# Patient Record
Sex: MALE | URBAN - METROPOLITAN AREA
[De-identification: names, ages, dates, MRNs, and addresses within clinical notes are randomized per-mention and may not be internally consistent; named-entity substitution may affect disease eponyms.]

---

## 2020-10-30 ENCOUNTER — NEW PATIENT (OUTPATIENT)
Dept: URBAN - METROPOLITAN AREA CLINIC 56 | Facility: CLINIC | Age: 75
End: 2020-10-30
Payer: MEDICARE

## 2020-10-30 DIAGNOSIS — Z79.84 LONG TERM (CURRENT) USE OF ORAL ANTIDIABETIC DRUGS: ICD-10-CM

## 2020-10-30 DIAGNOSIS — H25.13 AGE-RELATED NUCLEAR CATARACT, BILATERAL: Primary | ICD-10-CM

## 2020-10-30 DIAGNOSIS — H11.041 PERIPHERAL PTERYGIUM, STATIONARY, RIGHT EYE: ICD-10-CM

## 2020-10-30 DIAGNOSIS — E11.9 TYPE 2 DIABETES MELLITUS WITHOUT COMPLICATIONS: ICD-10-CM

## 2020-10-30 PROCEDURE — 92004 COMPRE OPH EXAM NEW PT 1/>: CPT | Performed by: OPHTHALMOLOGY

## 2020-10-30 PROCEDURE — 92250 FUNDUS PHOTOGRAPHY W/I&R: CPT | Performed by: OPHTHALMOLOGY

## 2020-10-30 ASSESSMENT — KERATOMETRY
OS: 44.09
OD: 44.01

## 2020-10-30 ASSESSMENT — VISUAL ACUITY
OD: 20/20
OS: 20/20

## 2020-10-30 ASSESSMENT — INTRAOCULAR PRESSURE
OS: 16
OD: 16

## 2024-10-03 ENCOUNTER — OFFICE VISIT (OUTPATIENT)
Dept: URBAN - METROPOLITAN AREA CLINIC 56 | Facility: LOCATION | Age: 79
End: 2024-10-03
Payer: MEDICARE

## 2024-10-03 DIAGNOSIS — E11.9 TYPE 2 DIABETES MELLITUS W/O COMPLICATION: Primary | ICD-10-CM

## 2024-10-03 DIAGNOSIS — H02.34 BLEPHAROCHALASIS OF LEFT UPPER LID: ICD-10-CM

## 2024-10-03 DIAGNOSIS — H25.813 COMBINED FORMS OF AGE-RELATED CATARACT, BILATERAL: ICD-10-CM

## 2024-10-03 DIAGNOSIS — H02.31 BLEPHAROCHALASIS OF RIGHT UPPER LID: ICD-10-CM

## 2024-10-03 DIAGNOSIS — H35.373 PUCKERING OF MACULA, BILATERAL: ICD-10-CM

## 2024-10-03 DIAGNOSIS — D23.121 OTHER BENIGN NEOPLASM SKIN/ LEFT UPPER EYELID, INC CANTHUS: ICD-10-CM

## 2024-10-03 PROCEDURE — 92134 CPTRZ OPH DX IMG PST SGM RTA: CPT | Performed by: OPHTHALMOLOGY

## 2024-10-03 PROCEDURE — 92004 COMPRE OPH EXAM NEW PT 1/>: CPT | Performed by: OPHTHALMOLOGY

## 2024-10-03 ASSESSMENT — INTRAOCULAR PRESSURE
OD: 16
OS: 16

## 2024-10-03 ASSESSMENT — KERATOMETRY
OS: 43.87
OD: 44.13

## 2024-11-04 ENCOUNTER — APPOINTMENT (RX ONLY)
Dept: URBAN - METROPOLITAN AREA CLINIC 174 | Facility: CLINIC | Age: 79
Setting detail: DERMATOLOGY
End: 2024-11-04

## 2024-11-04 DIAGNOSIS — L81.4 OTHER MELANIN HYPERPIGMENTATION: ICD-10-CM

## 2024-11-04 DIAGNOSIS — L82.1 OTHER SEBORRHEIC KERATOSIS: ICD-10-CM

## 2024-11-04 DIAGNOSIS — D18.0 HEMANGIOMA: ICD-10-CM

## 2024-11-04 PROBLEM — D18.01 HEMANGIOMA OF SKIN AND SUBCUTANEOUS TISSUE: Status: ACTIVE | Noted: 2024-11-04

## 2024-11-04 PROCEDURE — 99203 OFFICE O/P NEW LOW 30 MIN: CPT

## 2024-11-04 PROCEDURE — ? COUNSELING

## 2024-11-04 ASSESSMENT — LOCATION SIMPLE DESCRIPTION DERM
LOCATION SIMPLE: SCALP
LOCATION SIMPLE: LEFT ANTERIOR NECK
LOCATION SIMPLE: LEFT CHEEK
LOCATION SIMPLE: LEFT FOREHEAD

## 2024-11-04 ASSESSMENT — LOCATION ZONE DERM
LOCATION ZONE: FACE
LOCATION ZONE: NECK
LOCATION ZONE: SCALP

## 2024-11-04 ASSESSMENT — LOCATION DETAILED DESCRIPTION DERM
LOCATION DETAILED: LEFT CENTRAL MALAR CHEEK
LOCATION DETAILED: LEFT SUPERIOR ANTERIOR NECK
LOCATION DETAILED: LEFT SUPERIOR FOREHEAD
LOCATION DETAILED: LEFT SUPERIOR FRONTAL SCALP

## 2024-11-04 NOTE — PROCEDURE: COUNSELING
Detail Level: Detailed
The Caprini score indicates that this patient is at high risk for a VTE event (score 6 or greater). Surgical patients in this group will benefit from both pharmacologic prophylaxis and intermittent compression devices.  The surgical team will determine the balance between VTE risk and bleeding risk, and other clinical considerations

## 2024-11-19 ENCOUNTER — OFFICE VISIT (OUTPATIENT)
Facility: LOCATION | Age: 79
End: 2024-11-19
Payer: COMMERCIAL

## 2024-11-19 DIAGNOSIS — E11.9 TYPE II DIABETES MELLITUS WITHOUT COMPLICATION: ICD-10-CM

## 2024-11-19 DIAGNOSIS — H52.4 PRESBYOPIA: Primary | ICD-10-CM

## 2024-11-19 DIAGNOSIS — H25.813 COMBINED FORMS OF AGE-RELATED CATARACT, BILATERAL: ICD-10-CM

## 2024-11-19 DIAGNOSIS — D23.121 OTHER BENIGN NEOPLASM SKIN/ LEFT UPPER EYELID, INC CANTHUS: ICD-10-CM

## 2024-11-19 PROCEDURE — 92004 COMPRE OPH EXAM NEW PT 1/>: CPT

## 2024-11-19 ASSESSMENT — INTRAOCULAR PRESSURE
OD: 15
OS: 20

## 2024-11-19 ASSESSMENT — KERATOMETRY
OD: 44.25
OS: 43.63

## 2024-11-19 ASSESSMENT — VISUAL ACUITY
OD: 20/25
OS: 20/20

## 2025-03-03 ENCOUNTER — OFFICE VISIT (OUTPATIENT)
Dept: URBAN - METROPOLITAN AREA CLINIC 56 | Facility: LOCATION | Age: 80
End: 2025-03-03
Payer: MEDICARE

## 2025-03-03 DIAGNOSIS — D23.121 OTHER BENIGN NEOPLASM SKIN/ LEFT UPPER EYELID, INC CANTHUS: ICD-10-CM

## 2025-03-03 DIAGNOSIS — H02.831 DERMATOCHALASIS OF RIGHT UPPER LID: Primary | ICD-10-CM

## 2025-03-03 DIAGNOSIS — H02.834 DERMATOCHALASIS OF LEFT UPPER LID: ICD-10-CM

## 2025-05-27 ENCOUNTER — OFFICE VISIT (OUTPATIENT)
Facility: LOCATION | Age: 80
End: 2025-05-27
Payer: MEDICARE

## 2025-05-27 DIAGNOSIS — E11.9 TYPE II DIABETES MELLITUS WITHOUT COMPLICATION: Primary | ICD-10-CM

## 2025-05-27 DIAGNOSIS — H25.813 COMBINED FORMS OF AGE-RELATED CATARACT, BILATERAL: ICD-10-CM

## 2025-05-27 DIAGNOSIS — H02.834 DERMATOCHALASIS OF LEFT UPPER LID: ICD-10-CM

## 2025-05-27 DIAGNOSIS — H02.831 DERMATOCHALASIS OF RIGHT UPPER LID: ICD-10-CM

## 2025-05-27 DIAGNOSIS — H16.223 KERATOCONJUNCT SICCA, NOT SPECIFIED AS SJOGREN'S, BILATERAL: ICD-10-CM

## 2025-05-27 PROCEDURE — 99214 OFFICE O/P EST MOD 30 MIN: CPT

## 2025-05-27 ASSESSMENT — KERATOMETRY
OS: 43.75
OD: 44.38

## 2025-05-27 ASSESSMENT — INTRAOCULAR PRESSURE
OD: 14
OS: 14

## 2025-05-27 ASSESSMENT — VISUAL ACUITY
OS: 20/20
OD: 20/30

## 2025-06-19 ENCOUNTER — OFFICE VISIT (OUTPATIENT)
Facility: LOCATION | Age: 80
End: 2025-06-19
Payer: COMMERCIAL

## 2025-06-19 DIAGNOSIS — H52.4 PRESBYOPIA: Primary | ICD-10-CM

## 2025-06-19 PROCEDURE — 92012 INTRM OPH EXAM EST PATIENT: CPT

## 2025-06-19 ASSESSMENT — VISUAL ACUITY
OS: 20/30
OD: 20/30

## 2025-06-19 ASSESSMENT — KERATOMETRY
OD: 44.50
OS: 43.75

## 2025-07-17 ENCOUNTER — OFFICE VISIT (OUTPATIENT)
Dept: URBAN - METROPOLITAN AREA CLINIC 44 | Facility: CLINIC | Age: 80
End: 2025-07-17
Payer: MEDICARE

## 2025-07-17 DIAGNOSIS — H02.834 DERMATOCHALASIS OF LEFT UPPER LID: ICD-10-CM

## 2025-07-17 DIAGNOSIS — H02.31 BLEPHAROCHALASIS RIGHT UPPER EYELID: ICD-10-CM

## 2025-07-17 DIAGNOSIS — D48.19 OTH NEOPLASM OF UNCERTAIN BEHAVIOR OF CONNCTV/SOFT TISS: ICD-10-CM

## 2025-07-17 DIAGNOSIS — H02.831 DERMATOCHALASIS OF RIGHT UPPER LID: Primary | ICD-10-CM

## 2025-07-17 PROCEDURE — 99214 OFFICE O/P EST MOD 30 MIN: CPT | Performed by: OPHTHALMOLOGY

## 2025-07-17 PROCEDURE — 92285 EXTERNAL OCULAR PHOTOGRAPHY: CPT | Performed by: OPHTHALMOLOGY

## 2025-07-17 PROCEDURE — 92082 INTERMEDIATE VISUAL FIELD XM: CPT | Performed by: OPHTHALMOLOGY

## 2025-07-31 ENCOUNTER — OFFICE VISIT (OUTPATIENT)
Dept: URBAN - METROPOLITAN AREA CLINIC 44 | Facility: CLINIC | Age: 80
End: 2025-07-31
Payer: MEDICARE

## 2025-07-31 DIAGNOSIS — D48.19 OTH NEOPLASM OF UNCERTAIN BEHAVIOR OF CONNCTV/SOFT TISS: Primary | ICD-10-CM

## 2025-07-31 PROCEDURE — 67840 REMOVE EYELID LESION: CPT

## 2025-07-31 RX ORDER — NEOMYCIN SULFATE, POLYMYXIN B SULFATE AND DEXAMETHASONE 1; 3.5; 1 MG/G; MG/G; [USP'U]/G
OINTMENT OPHTHALMIC
Qty: 1 | Refills: 1 | Status: ACTIVE
Start: 2025-07-31